# Patient Record
Sex: MALE
[De-identification: names, ages, dates, MRNs, and addresses within clinical notes are randomized per-mention and may not be internally consistent; named-entity substitution may affect disease eponyms.]

---

## 2021-04-11 ENCOUNTER — APPOINTMENT (OUTPATIENT)
Age: 36
End: 2021-04-11
Payer: COMMERCIAL

## 2021-04-11 PROCEDURE — 0001A: CPT

## 2021-05-02 ENCOUNTER — APPOINTMENT (OUTPATIENT)
Age: 36
End: 2021-05-02

## 2023-07-19 PROBLEM — Z00.00 ENCOUNTER FOR PREVENTIVE HEALTH EXAMINATION: Status: ACTIVE | Noted: 2023-07-19

## 2023-07-20 ENCOUNTER — APPOINTMENT (OUTPATIENT)
Dept: PHYSICAL MEDICINE AND REHAB | Facility: CLINIC | Age: 38
End: 2023-07-20
Payer: COMMERCIAL

## 2023-07-20 VITALS
SYSTOLIC BLOOD PRESSURE: 116 MMHG | WEIGHT: 200 LBS | DIASTOLIC BLOOD PRESSURE: 72 MMHG | HEART RATE: 66 BPM | BODY MASS INDEX: 32.14 KG/M2 | HEIGHT: 66 IN

## 2023-07-20 DIAGNOSIS — M25.512 PAIN IN RIGHT SHOULDER: ICD-10-CM

## 2023-07-20 DIAGNOSIS — M25.511 PAIN IN RIGHT SHOULDER: ICD-10-CM

## 2023-07-20 DIAGNOSIS — M79.18 MYALGIA, OTHER SITE: ICD-10-CM

## 2023-07-20 DIAGNOSIS — M47.812 SPONDYLOSIS W/OUT MYELOPATHY OR RADICULOPATHY, CERVICAL REGION: ICD-10-CM

## 2023-07-20 PROCEDURE — 99204 OFFICE O/P NEW MOD 45 MIN: CPT | Mod: GC

## 2023-07-20 RX ORDER — METHOCARBAMOL 500 MG/1
500 TABLET, FILM COATED ORAL
Qty: 30 | Refills: 0 | Status: ACTIVE | COMMUNITY
Start: 2023-07-20 | End: 1900-01-01

## 2023-07-20 RX ORDER — MELOXICAM 7.5 MG/1
7.5 TABLET ORAL
Qty: 28 | Refills: 0 | Status: ACTIVE | COMMUNITY
Start: 2023-07-20 | End: 1900-01-01

## 2023-07-20 NOTE — ASSESSMENT
[FreeTextEntry1] : Mr. RAJAT KOHLER  is a 37 year old male who presents with neck and shoulder pain. Pain is likely myofascial in nature, may have component of underlying shoulder pathology. Denies any red flag signs. Will recommend:\par - Start PT 2-3x/week for stretching, strengthening, ROM exercises, HEP and modalities PRN including myofascial release, moist heat \par - Mobic 7.5mg BID x 1 week, and then PRN thereafter. Patient advised on cardiac/gi/renal side effects. Patient encouraged to take medication with food and not with other NSAIDs. \par - Methocarbamol 500mg Qhs PRN. Advised of side effects including sedation. \par - Will obtain X-rays of bilateral shoulders and C Spine\par \par RTC in 1 month. Patient aware of red flag signs including any changes to their bowel/bladder control, groin numbness or new weakness. Patient knows to seek immediate attention by calling 911 or going to nearest ER if these symptoms appear.

## 2023-07-20 NOTE — PHYSICAL EXAM
[FreeTextEntry1] : PE:\par Constitutional: In NAD, calm and cooperative\par MSK (Neck/Shoulder ):\par 	Inspection: no gross swelling identified\par 	Palpation: Tenderness of the b/l trapezius, anterior shoulders and posterior neck\par 	ROM: intactROM neck flexion/extension, does exhibit some pain on the b/l trap when rotating neck L and R\par 	Strength: 5/5 strength in bilateral upper extremities\par 	Reflexes: 2+ Biceps/Brachioradialis reflex bilaterally, Plaza’s negative bilaterally\par 	Sensation: Intact to light touch in bilateral upper extremities\par 	Special tests: \par Neers Test: negative but pressure on the b/l trap\par Empty Can: negative but pressure on the b/l trap\par Scarf:  negative but pressure on the b/l trap

## 2023-07-20 NOTE — HISTORY OF PRESENT ILLNESS
[FreeTextEntry1] : Mr. RAJAT KOHLER  is a 37 year old male who presents with neck and shoulder pain. \par \par Location: neck/shoulder pain \par Onset:many years, no inciting event\par Provocation/Palliative: morning when waking up it is worst, movement and at work pain is better \par Quality: Dull aching \par Radiation: radiates to the b/l  anterior shoulder\par Severity:7-8/10\par Timing:constant \par \par Denies any associated numbness. Denies any associated arm or hand weakness. Denies any loss of bowel/bladder control or any groin numbness.\par Previous medications trialed: tylenol with minimal relief\par Previous procedures relevant to complaint: no\par Has tried conservative treatment?: no PT\par

## 2023-07-24 ENCOUNTER — APPOINTMENT (OUTPATIENT)
Dept: RADIOLOGY | Facility: CLINIC | Age: 38
End: 2023-07-24